# Patient Record
Sex: FEMALE | Race: OTHER | Employment: FULL TIME | URBAN - METROPOLITAN AREA
[De-identification: names, ages, dates, MRNs, and addresses within clinical notes are randomized per-mention and may not be internally consistent; named-entity substitution may affect disease eponyms.]

---

## 2022-01-20 ENCOUNTER — APPOINTMENT (OUTPATIENT)
Dept: CT IMAGING | Age: 37
End: 2022-01-20
Attending: STUDENT IN AN ORGANIZED HEALTH CARE EDUCATION/TRAINING PROGRAM
Payer: COMMERCIAL

## 2022-01-20 ENCOUNTER — HOSPITAL ENCOUNTER (EMERGENCY)
Age: 37
Discharge: HOME | End: 2022-01-20
Attending: EMERGENCY MEDICINE
Payer: COMMERCIAL

## 2022-01-20 VITALS
DIASTOLIC BLOOD PRESSURE: 70 MMHG | SYSTOLIC BLOOD PRESSURE: 130 MMHG | HEIGHT: 66 IN | OXYGEN SATURATION: 100 % | HEART RATE: 69 BPM | WEIGHT: 175.49 LBS | RESPIRATION RATE: 16 BRPM | BODY MASS INDEX: 28.2 KG/M2 | TEMPERATURE: 98.2 F

## 2022-01-20 DIAGNOSIS — G44.319 ACUTE POST-TRAUMATIC HEADACHE, NOT INTRACTABLE: Primary | ICD-10-CM

## 2022-01-20 DIAGNOSIS — S80.211A ABRASION OF RIGHT KNEE, INITIAL ENCOUNTER: ICD-10-CM
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
## 2022-01-20 PROCEDURE — 70450 CT HEAD/BRAIN W/O DYE: CPT

## 2022-01-20 PROCEDURE — 99283 EMERGENCY DEPT VISIT LOW MDM: CPT
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
## 2022-01-20 NOTE — DISCHARGE INSTRUCTIONS
No abnormalities found on physical exam today. Please get plenty of rest and can treat your pain with Advil or Tylenol as needed. Avoid excessive screen time for next week. Monitor symptoms and follow-up with your PCP as soon as possible.

## 2022-01-27 ENCOUNTER — HOSPITAL ENCOUNTER (EMERGENCY)
Age: 37
Discharge: HOME | End: 2022-01-27
Attending: STUDENT IN AN ORGANIZED HEALTH CARE EDUCATION/TRAINING PROGRAM
Payer: COMMERCIAL

## 2022-01-27 VITALS
DIASTOLIC BLOOD PRESSURE: 83 MMHG | OXYGEN SATURATION: 100 % | HEART RATE: 76 BPM | SYSTOLIC BLOOD PRESSURE: 136 MMHG | TEMPERATURE: 97.9 F | RESPIRATION RATE: 16 BRPM

## 2022-01-27 DIAGNOSIS — F07.81 POST CONCUSSION SYNDROME: Primary | ICD-10-CM

## 2022-01-27 PROCEDURE — 99283 EMERGENCY DEPT VISIT LOW MDM: CPT

## 2022-01-27 RX ORDER — KETOROLAC TROMETHAMINE 10 MG/1
10 TABLET, FILM COATED ORAL
Qty: 8 TABLET | Refills: 0 | Status: SHIPPED | OUTPATIENT
Start: 2022-01-27 | End: 2022-01-29

## 2022-01-27 NOTE — Clinical Note
[facility]  [address]  [address]  [phone]    Work/School Note    Date: 1/27/2022    To Whom It May concern:    Thiago Flores was seen and treated today in the emergency room by the following provider(s):  Attending Provider: Jaden Euceda MD.      Thiago Flores is excused from work/school on 1/27/2022 through 1/29/2022. She is medically clear to return to work/school on 1/30/2022.          Sincerely,          Emperatriz Cardenas MD

## 2022-01-27 NOTE — Clinical Note
[facility]  [address]  [address]  [phone]    Work/School Note    Date: 1/27/2022    To Whom It May concern:    Luis Alfredo Viramontes was seen and treated today in the emergency room by the following provider(s):  Attending Provider: Tiffany Marinelli MD.      Luis Alfredo Viramontes is excused from work/school on 1/27/2022 through 1/29/2022. She is medically clear to return to work/school on 1/30/2022.          Sincerely,          Callie Sandy MD
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
